# Patient Record
Sex: MALE | Race: OTHER | HISPANIC OR LATINO | Employment: FULL TIME | URBAN - METROPOLITAN AREA
[De-identification: names, ages, dates, MRNs, and addresses within clinical notes are randomized per-mention and may not be internally consistent; named-entity substitution may affect disease eponyms.]

---

## 2020-03-24 ENCOUNTER — HOSPITAL ENCOUNTER (EMERGENCY)
Facility: HOSPITAL | Age: 35
Discharge: HOME | End: 2020-03-24
Attending: EMERGENCY MEDICINE | Admitting: EMERGENCY MEDICINE

## 2020-03-24 VITALS
RESPIRATION RATE: 18 BRPM | TEMPERATURE: 97.9 F | DIASTOLIC BLOOD PRESSURE: 76 MMHG | HEART RATE: 61 BPM | OXYGEN SATURATION: 100 % | WEIGHT: 147.93 LBS | SYSTOLIC BLOOD PRESSURE: 123 MMHG

## 2020-03-24 DIAGNOSIS — R52 BODY ACHES: ICD-10-CM

## 2020-03-24 DIAGNOSIS — R05.9 COUGH: Primary | ICD-10-CM

## 2020-03-24 PROCEDURE — 99283 EMERGENCY DEPT VISIT LOW MDM: CPT

## 2020-03-24 PROCEDURE — 99282 EMERGENCY DEPT VISIT SF MDM: CPT | Performed by: EMERGENCY MEDICINE

## 2020-03-25 ENCOUNTER — TELEPHONE (OUTPATIENT)
Dept: OTHER | Facility: OTHER | Age: 35
End: 2020-03-25

## 2020-03-25 NOTE — ED ATTENDING ATTESTATION
3/24/2020  IMartina DO, saw and evaluated the patient  I have discussed the patient with the resident/non-physician practitioner and agree with the resident's/non-physician practitioner's findings, Plan of Care, and MDM as documented in the resident's/non-physician practitioner's note, except where noted  All available labs and Radiology studies were reviewed  I was present for key portions of any procedure(s) performed by the resident/non-physician practitioner and I was immediately available to provide assistance  At this point I agree with the current assessment done in the Emergency Department  I have conducted an independent evaluation of this patient a history and physical is as follows:    ED Course     27 y o  M p/w cough x yesterday  Sore throat, runny nose, and body aches  Took Vicks cold and flu  Denies F/C, CP, SOB, abd pain, N/V/D, sick contacts, recent travel  Pt wants testing for COVID  Pt does not meet criteria (no fevers) for testing from the ER  On exam, pt in NAD  No tachypnea  Lungs CTAB  Plan: Will give hotlines for testing sites      Critical Care Time  Procedures

## 2020-03-25 NOTE — TELEPHONE ENCOUNTER
Patient called into my department unable to return to work due to the AVS he received from the ED  He does not meet criteria to be swabbed  He does not have a PCP and has not insurance  Unsure what to do to assist the patient  Per ED AVS - he can not return to work for 14 days  Stated I will call the patient back once I have an idea of what is his best next step

## 2020-03-25 NOTE — DISCHARGE INSTRUCTIONS
It is possible that you have COVID-19 at this time, but you do not meet criteria for testing in the emergency department  If you still wish to be tested please call the hotline at 0-949-LVKHFCF (265-8355), option 7 or email [email or web address] to be referred to a testing site  If you are not tested, you should remain in isolation for 14 days, or until you have a negative test  Please return to the emergency department for difficulty breathing, chest pain, fever, vomiting, any other concerns  [address]    Your healthcare provider and/or public health staff have evaluated you and have determined that you do not need to remain in the hospital at this time  At this time you can be isolated at home where you will be monitored by staff from your local or state health department  You should carefully follow the prevention and isolation steps below until a healthcare provider or local or state health department says that you can return to your normal activities  Stay home except to get medical care    People who are mildly ill with COVID-19 are able to isolate at home during their illness  You should restrict activities outside your home, except for getting medical care  Do not go to work, school, or public areas  Avoid using public transportation, ride-sharing, or taxis  Separate yourself from other people and animals in your home    People: As much as possible, you should stay in a specific room and away from other people in your home  Also, you should use a separate bathroom, if available  Animals: You should restrict contact with pets and other animals while you are sick with COVID-19, just like you would around other people  Although there have not been reports of pets or other animals becoming sick with COVID-19, it is still recommended that people sick with COVID-19 limit contact with animals until more information is known about the virus   When possible, have another member of your household care for your animals while you are sick  If you are sick with COVID-19, avoid contact with your pet, including petting, snuggling, being kissed or licked, and sharing food  If you must care for your pet or be around animals while you are sick, wash your hands before and after you interact with pets and wear a facemask  See COVID-19 and Animals for more information  Call ahead before visiting your doctor    If you have a medical appointment, call the healthcare provider and tell them that you have or may have COVID-19  This will help the healthcare providers office take steps to keep other people from getting infected or exposed  Wear a facemask    You should wear a facemask when you are around other people (e g , sharing a room or vehicle) or pets and before you enter a healthcare providers office  If you are not able to wear a facemask (for example, because it causes trouble breathing), then people who live with you should not stay in the same room with you, or they should wear a facemask if they enter your room  Cover your coughs and sneezes    Cover your mouth and nose with a tissue when you cough or sneeze  Throw used tissues in a lined trash can  Immediately wash your hands with soap and water for at least 20 seconds or, if soap and water are not available, clean your hands with an alcohol-based hand  that contains at least 60% alcohol  Clean your hands often    Wash your hands often with soap and water for at least 20 seconds, especially after blowing your nose, coughing, or sneezing; going to the bathroom; and before eating or preparing food  If soap and water are not readily available, use an alcohol-based hand  with at least 60% alcohol, covering all surfaces of your hands and rubbing them together until they feel dry  Soap and water are the best option if hands are visibly dirty  Avoid touching your eyes, nose, and mouth with unwashed hands      Avoid sharing personal household items    You should not share dishes, drinking glasses, cups, eating utensils, towels, or bedding with other people or pets in your home  After using these items, they should be washed thoroughly with soap and water  Clean all high-touch surfaces everyday    High touch surfaces include counters, tabletops, doorknobs, bathroom fixtures, toilets, phones, keyboards, tablets, and bedside tables  Also, clean any surfaces that may have blood, stool, or body fluids on them  Use a household cleaning spray or wipe, according to the label instructions  Labels contain instructions for safe and effective use of the cleaning product including precautions you should take when applying the product, such as wearing gloves and making sure you have good ventilation during use of the product  Monitor your symptoms    Seek prompt medical attention if your illness is worsening (e g , difficulty breathing)  Before seeking care, call your healthcare provider and tell them that you have, or are being evaluated for, COVID-19  Put on a facemask before you enter the facility  These steps will help the healthcare providers office to keep other people in the office or waiting room from getting infected or exposed  Ask your healthcare provider to call the local or state health department  Persons who are placed under active monitoring or facilitated self-monitoring should follow instructions provided by their local health department or occupational health professionals, as appropriate  If you have a medical emergency and need to call 911, notify the dispatch personnel that you have, or are being evaluated for COVID-19  If possible, put on a facemask before emergency medical services arrive  Discontinuing home isolation    Patients with confirmed COVID-19 should remain under home isolation precautions until the risk of secondary transmission to others is thought to be low   The decision to discontinue home isolation precautions should be made on a case-by-case basis, in consultation with healthcare providers and state and local health departments      Source: [employer]

## 2020-03-25 NOTE — ED PROVIDER NOTES
History  Chief Complaint   Patient presents with    Cough     Patient states dry cough since yesterday  Taking vicks cold/flu  Generalized aches  Denies N/V/D, denies sob, denies headache  HPI  28 yo male presents to the ED with concern for cough, chills, and body aches since yesterday  No chest pain, chest tightness, shortness of breath, fever, nausea, vomiting, abdominal pain  No significant relief with davida's cold/flu OTC  No sick contacts  No recent travel  Pt is concerned that he has COVID-19, and is worried about returning to work  None       History reviewed  No pertinent past medical history  History reviewed  No pertinent surgical history  History reviewed  No pertinent family history  I have reviewed and agree with the history as documented  E-Cigarette/Vaping    E-Cigarette Use Never User      E-Cigarette/Vaping Substances     Social History     Tobacco Use    Smoking status: Never Smoker    Smokeless tobacco: Never Used   Substance Use Topics    Alcohol use: Not Currently    Drug use: Never        Review of Systems   Constitutional: Positive for chills  Negative for fever  HENT: Negative for congestion, rhinorrhea and sore throat  Respiratory: Positive for cough  Negative for shortness of breath  Cardiovascular: Negative for chest pain and palpitations  Gastrointestinal: Negative for abdominal pain, nausea and vomiting  Genitourinary: Negative for dysuria and hematuria  Musculoskeletal: Positive for arthralgias and myalgias  Skin: Negative for rash  Neurological: Negative for dizziness, weakness, light-headedness, numbness and headaches  All other systems reviewed and are negative        Physical Exam  ED Triage Vitals [03/24/20 2005]   Temperature Pulse Respirations Blood Pressure SpO2   97 9 °F (36 6 °C) 61 18 123/76 100 %      Temp Source Heart Rate Source Patient Position - Orthostatic VS BP Location FiO2 (%)   Temporal Monitor Sitting Right arm --      Pain Score       2             Orthostatic Vital Signs  Vitals:    03/24/20 2005   BP: 123/76   Pulse: 61   Patient Position - Orthostatic VS: Sitting       Physical Exam   Constitutional: He is oriented to person, place, and time  He appears well-developed and well-nourished  No distress  HENT:   Head: Normocephalic and atraumatic  Right Ear: External ear normal    Left Ear: External ear normal    Mouth/Throat: Oropharynx is clear and moist    Eyes: Pupils are equal, round, and reactive to light  Conjunctivae are normal    Neck: Normal range of motion  Neck supple  Cardiovascular: Normal rate, regular rhythm, normal heart sounds and intact distal pulses  Exam reveals no gallop and no friction rub  No murmur heard  Pulmonary/Chest: Effort normal and breath sounds normal  No respiratory distress  He has no wheezes  He has no rhonchi  He has no rales  Abdominal: Soft  Bowel sounds are normal  He exhibits no distension  There is no tenderness  Musculoskeletal: Normal range of motion  Lymphadenopathy:     He has no cervical adenopathy  Neurological: He is alert and oriented to person, place, and time  He has normal strength  No cranial nerve deficit or sensory deficit  Skin: Skin is warm and dry  He is not diaphoretic  Nursing note and vitals reviewed  ED Medications  Medications - No data to display    Diagnostic Studies  Results Reviewed     None                 No orders to display         Procedures  Procedures      ED Course                                 MDM  Number of Diagnoses or Management Options  Body aches:   Cough:     26 yo male with cough, chills, and body aches  Likely viral URI  COVID-19 is possible, however pt does not currently meet ED testing criteria and is stable without hypoxia, tachypnea, or fever  Will discharge with instructions for 14 day self-isolation, and follow up with [facility] hotline for possible outpatient testing       Surgical mask was worn by myself and the patient for the duration of this encounter  Disposition  Final diagnoses:   Cough   Body aches     Time reflects when diagnosis was documented in both MDM as applicable and the Disposition within this note     Time User Action Codes Description Comment    3/24/2020  8:23 PM [place] Add [R05] Cough     3/24/2020  8:23 PM Ty [place] Add [R52] Body aches       ED Disposition     ED Disposition Condition Date/Time Comment    Discharge Stable Tue Mar 24, 2020  8:23 PM Cristhian Zuleta discharge to home/self care  Follow-up Information     Follow up With Specialties Details Why Contact Info Additional [address] [facility] Emergency Department Emergency Medicine  As needed, If symptoms worsen [facility] [address]  [phone] [facility], [address], [place], [facility]          There are no discharge medications for this patient  No discharge procedures on file  PDMP Review     None           ED Provider  Attending physically available and evaluated Cristhian Zuleta I managed the patient along with the ED Attending      Electronically Signed by         Tanivr Amin MD  03/24/20 7563

## 2024-05-29 NOTE — ED NOTES
Patient complains of lower back pain     Chu Minaya RN  03/24/20 2006
Provider at bedside        Ana Faith RN  03/24/20 2016
Pt assessed and discharged prior to nurse assessment        Demetrius Rose RN  03/24/20 2036
No